# Patient Record
Sex: FEMALE | Race: BLACK OR AFRICAN AMERICAN | NOT HISPANIC OR LATINO | Employment: UNEMPLOYED | URBAN - METROPOLITAN AREA
[De-identification: names, ages, dates, MRNs, and addresses within clinical notes are randomized per-mention and may not be internally consistent; named-entity substitution may affect disease eponyms.]

---

## 2017-02-13 PROBLEM — Z30.09 OTHER GENERAL COUNSELING AND ADVICE FOR CONTRACEPTIVE MANAGEMENT: Status: ACTIVE | Noted: 2017-02-13

## 2018-01-01 ENCOUNTER — HOSPITAL ENCOUNTER (EMERGENCY)
Facility: HOSPITAL | Age: 33
Discharge: HOME | End: 2018-01-01
Attending: EMERGENCY MEDICINE
Payer: MEDICAID

## 2018-01-01 VITALS
TEMPERATURE: 98 F | BODY MASS INDEX: 33.13 KG/M2 | HEART RATE: 100 BPM | OXYGEN SATURATION: 100 % | HEIGHT: 62 IN | DIASTOLIC BLOOD PRESSURE: 72 MMHG | SYSTOLIC BLOOD PRESSURE: 119 MMHG | RESPIRATION RATE: 20 BRPM | WEIGHT: 180 LBS

## 2018-01-01 DIAGNOSIS — F41.0 PANIC ATTACK: Primary | ICD-10-CM

## 2018-01-01 LAB
B-HCG UR QL: NEGATIVE
CTP QC/QA: YES

## 2018-01-01 PROCEDURE — 63600175 PHARM REV CODE 636 W HCPCS

## 2018-01-01 PROCEDURE — 81025 URINE PREGNANCY TEST: CPT | Performed by: EMERGENCY MEDICINE

## 2018-01-01 PROCEDURE — 96374 THER/PROPH/DIAG INJ IV PUSH: CPT

## 2018-01-01 PROCEDURE — 25000003 PHARM REV CODE 250: Performed by: EMERGENCY MEDICINE

## 2018-01-01 PROCEDURE — 99284 EMERGENCY DEPT VISIT MOD MDM: CPT | Mod: 25

## 2018-01-01 RX ORDER — LORAZEPAM 2 MG/ML
INJECTION INTRAMUSCULAR
Status: COMPLETED
Start: 2018-01-01 | End: 2018-01-01

## 2018-01-01 RX ORDER — ALPRAZOLAM 0.5 MG/1
0.5 TABLET ORAL 2 TIMES DAILY PRN
Qty: 10 TABLET | Refills: 0 | Status: SHIPPED | OUTPATIENT
Start: 2018-01-01 | End: 2018-05-15

## 2018-01-01 RX ORDER — DIAZEPAM 2 MG/1
2 TABLET ORAL
Status: COMPLETED | OUTPATIENT
Start: 2018-01-01 | End: 2018-01-01

## 2018-01-01 RX ORDER — TRAZODONE HYDROCHLORIDE 50 MG/1
50 TABLET ORAL NIGHTLY PRN
COMMUNITY
End: 2018-08-13

## 2018-01-01 RX ORDER — OXCARBAZEPINE 150 MG/1
300 TABLET, FILM COATED ORAL 2 TIMES DAILY
COMMUNITY
End: 2019-08-21

## 2018-01-01 RX ORDER — LORAZEPAM 2 MG/ML
1 INJECTION INTRAMUSCULAR
Status: COMPLETED | OUTPATIENT
Start: 2018-01-01 | End: 2018-01-01

## 2018-01-01 RX ADMIN — LORAZEPAM 1 MG: 2 INJECTION INTRAMUSCULAR at 02:01

## 2018-01-01 RX ADMIN — DIAZEPAM 2 MG: 2 TABLET ORAL at 03:01

## 2018-01-01 RX ADMIN — LORAZEPAM 1 MG: 2 INJECTION INTRAMUSCULAR; INTRAVENOUS at 02:01

## 2018-01-01 NOTE — DISCHARGE INSTRUCTIONS
"Return to the Emergency Department of any acute worsening of your symptoms or for any other concern.     You should return to the ED for fever/chills, shortness of breath, chest pain, weakness or "passing out".     Pt should take all medications as prescribed.    Pt should follow up with PCP as soon as possible.    The risks associated with not taking your medications as prescribed and not following up with your Primary Care doctor or sub specialist includes worsening of your condition, pain, disability, loss of function or livelihood, and death      DAVE Duff M.D. 3:54 AM 1/1/2018      Our goal in the emergency department is to always give you outstanding care and exceptional service. You may receive a survey by mail or e-mail in the next week regarding your experience in our ED. We would greatly appreciate your completing and returning the survey. Your feedback provides us with a way to recognize our staff who give very good care and it helps us learn how to improve when your experience was below our aspiration of excellence.     "

## 2018-01-01 NOTE — ED PROVIDER NOTES
Encounter Date: 2018    SCRIBE #1 NOTE: I, Jonathan Villalba, am scribing for, and in the presence of,  Navjot Duff MD. I have scribed the following portions of the note - Other sections scribed: HPI, ROS.       History     Chief Complaint   Patient presents with    Seizures     Patienr presents with a seizure after noncompliance with medications x 2 weeks. Patient also has been drinking tonight.      CC: Panic Attacks    HPI: This 32 y.o. Female, smoker, with a medical history of seizure presents to the ED via EMS c/o panic attack that began just prior to arrival. Panic attack was induced secondary to pt thinking about her  daughter that passed away near this time. EMS reported pt was having a seizure due to being off her seizure medication, but pt reports current symptoms are due to a panic attack. EMS reports pt has been off her trileptal for 2 weeks.       The history is provided by the EMS personnel and the patient. The history is limited by the condition of the patient. No  was used.     Review of patient's allergies indicates:  No Known Allergies  Past Medical History:   Diagnosis Date    Anxiety     PTSD (post-traumatic stress disorder)     Seizures      Past Surgical History:   Procedure Laterality Date     SECTION      x 2     Family History   Problem Relation Age of Onset    No Known Problems Mother     No Known Problems Father     Heart disease Maternal Grandfather     Breast cancer Paternal Grandmother     Colon cancer Neg Hx     Ovarian cancer Neg Hx      Social History   Substance Use Topics    Smoking status: Current Every Day Smoker     Years: 3.00     Types: Cigarettes, Cigars    Smokeless tobacco: Never Used      Comment: Pt smokes 2 to 3 cigars a day.    Alcohol use Yes      Comment: Pt reports she drinks liquor 2 to 3 times a week.     Review of Systems   Unable to perform ROS: Acuity of condition   Neurological: Negative for seizures.    Psychiatric/Behavioral: The patient is nervous/anxious.         (+) Panic attack       Physical Exam     Initial Vitals [01/01/18 0240]   BP Pulse Resp Temp SpO2   128/78 100 20 98.7 °F (37.1 °C) 100 %      MAP       94.67         Physical Exam    Vitals reviewed.  Constitutional: She appears well-developed and well-nourished.   HENT:   Head: Normocephalic and atraumatic.   Nose: Nose normal.   Mouth/Throat: No oropharyngeal exudate.   Eyes: EOM are normal. Pupils are equal, round, and reactive to light.   Neck: Normal range of motion. Neck supple. No JVD present.   Cardiovascular: Regular rhythm and normal heart sounds. Exam reveals no gallop and no friction rub.    No murmur heard.  Pulmonary/Chest: Breath sounds normal. No stridor. No respiratory distress. She has no wheezes. She has no rhonchi. She has no rales. She exhibits no tenderness.   Abdominal: Soft. Bowel sounds are normal. She exhibits no distension and no mass. There is no tenderness. There is no rebound and no guarding.   Musculoskeletal: Normal range of motion. She exhibits no edema or tenderness.   Neurological: She is alert and oriented to person, place, and time. She has normal strength. No cranial nerve deficit or sensory deficit. She exhibits normal muscle tone. GCS eye subscore is 4. GCS verbal subscore is 5. GCS motor subscore is 6.   Patient is shaking but this seems volitional.  She has good motor strength.  She is able to follow commands.  She is able to stutter her name to me.  My whole hand above her head.  She is able to volitionally move it away.   Skin: Skin is warm and dry.   Psychiatric: Her mood appears anxious. Her speech is delayed and slurred. She expresses impulsivity. She exhibits a depressed mood. She expresses no homicidal and no suicidal ideation.   Patient is tearful and smells of alcohol.         ED Course   Procedures  Labs Reviewed   POCT URINE PREGNANCY             Medical Decision Making:   History:   Old Medical  Records: I decided to obtain old medical records.  Initial Assessment:   Medical decision-making:    The patient received a medical screening exam. If performed, the EKG was independently evaluated by me and is pending final cardiology evaluation.  If performed, all radiographic studies were independently evaluated by me and are pending final radiology evaluation. If labs were ordered, they were reviewed. Vital signs are independently assessed by me.  If performed, the pulse oximetry was independently evaluated by me.  I decided to obtain the patient's past medical record.  If available, I reviewed the patient's past medical record, including most recent labs and radiology reports.    ED Management:  Patient was brought in for evaluation of some altered mental status.  There was some concern that the patient may be having a seizure.  She does not take Trileptal like it is prescribed.  She the patient states that she has never been diagnosed with a seizure disorder.  She says that the Trileptal is prescribed for mood alteration.  She states that she has never had an EEG or been diagnosed with seizures by a neurologist.  Upon presentation to the emergency department.  The patient is somewhat disorganized and shaking but has volitional movements and no sign of any neurological deficit.  The patient was administered Ativan with improvement of symptoms.  The patient was able to tell me that she suffers with debilitating panic attacks since the passing of her daughter one year ago.  She has been drinking and perseverating over that today.  I do not think the patient is having a true seizure.  I do not think she has an intracranial hemorrhage, or severe metabolic disturbance.  I think that she is acutely intoxicated with alcohol and is having a panic attack.  I will recommend that she follow-up with psychiatry.  I have encouraged her to discontinue alcohol use for the time being.  Xanax as needed when limited quantities.   Return precautions given.  The patient is requesting a sandwich at this time.    The results and physical exam findings were reviewed with the patient. Pt agrees with assessment, disposition and treatment plan and has no further questions or complaints at this time.    DAVE Duff M.D. 4:17 AM 1/1/2018              Scribe Attestation:   Scribe #1: I performed the above scribed service and the documentation accurately describes the services I performed. I attest to the accuracy of the note.    Attending Attestation:           Physician Attestation for Scribe:  Physician Attestation Statement for Scribe #1: I, Navjot Duff MD, reviewed documentation, as scribed by Jonathan Villalba in my presence, and it is both accurate and complete.                 ED Course      Clinical Impression:   The encounter diagnosis was Panic attack.                           Navjot Duff MD  01/01/18 0417

## 2018-01-01 NOTE — ED TRIAGE NOTES
P{t presents to the Ed via EMS after they state that her neighbors called them because she was having a seizure. Per EMS arrived opt was post ictal and staring. EMS states that pt woke up and said that she has a hx of seizures and has not taken her medication. They also report her telling them that she was drinkking and smoking pit. While pt was awaiting too assignment she had seizure like activity and was placed in a room. Pt crying to MD stating that she had an anxiety attack because she misses her daughter who passed away 1 year ago. Will continue to monitor.

## 2018-05-15 ENCOUNTER — HOSPITAL ENCOUNTER (EMERGENCY)
Facility: HOSPITAL | Age: 33
Discharge: HOME | End: 2018-05-15
Attending: EMERGENCY MEDICINE
Payer: MEDICAID

## 2018-05-15 VITALS
BODY MASS INDEX: 36.91 KG/M2 | HEART RATE: 94 BPM | OXYGEN SATURATION: 98 % | SYSTOLIC BLOOD PRESSURE: 154 MMHG | DIASTOLIC BLOOD PRESSURE: 89 MMHG | HEIGHT: 60 IN | WEIGHT: 188 LBS | RESPIRATION RATE: 20 BRPM | TEMPERATURE: 98 F

## 2018-05-15 DIAGNOSIS — F41.9 ACUTE ANXIETY: Primary | ICD-10-CM

## 2018-05-15 LAB
B-HCG UR QL: NEGATIVE
CTP QC/QA: YES

## 2018-05-15 PROCEDURE — 96374 THER/PROPH/DIAG INJ IV PUSH: CPT

## 2018-05-15 PROCEDURE — 81025 URINE PREGNANCY TEST: CPT | Performed by: EMERGENCY MEDICINE

## 2018-05-15 PROCEDURE — 99284 EMERGENCY DEPT VISIT MOD MDM: CPT | Mod: 25

## 2018-05-15 PROCEDURE — 63600175 PHARM REV CODE 636 W HCPCS: Performed by: EMERGENCY MEDICINE

## 2018-05-15 RX ORDER — LORAZEPAM 2 MG/ML
1 INJECTION INTRAMUSCULAR
Status: COMPLETED | OUTPATIENT
Start: 2018-05-15 | End: 2018-05-15

## 2018-05-15 RX ORDER — ALPRAZOLAM 0.5 MG/1
0.5 TABLET ORAL 4 TIMES DAILY PRN
Qty: 4 TABLET | Refills: 0 | Status: SHIPPED | OUTPATIENT
Start: 2018-05-15 | End: 2019-08-21

## 2018-05-15 RX ADMIN — LORAZEPAM 1 MG: 2 INJECTION, SOLUTION INTRAMUSCULAR; INTRAVENOUS at 04:05

## 2018-05-15 NOTE — ED PROVIDER NOTES
"Encounter Date: 5/15/2018    SCRIBE #1 NOTE: I, Vanessashantevonda Virgilio, am scribing for, and in the presence of,  Mane Adair MD. I have scribed the following portions of the note - Other sections scribed: HPI and ROS.       History     Chief Complaint   Patient presents with    Panic Attack     anxiety attack since Mother's Day. " I took my meds but it's not working." Pt is shaking in WC at this time. + tachypnea.      Chief Complaint: Panic Attack     HPI: This 32 y.o. Female with seizures, anxiety, and PTSD presents to the ED c/o persistent anxiety attacks. Symptoms began 2 days ago. Symptoms are severe and worsening. There's associated sleep disturbances due to anxiety. No improvement or relief with prescribed anxiety medications. She denies fever, chills, chest pain, cough, SOB, nausea, vomiting, or diarrhea.       The history is provided by the patient. No  was used.     Review of patient's allergies indicates:  No Known Allergies  Past Medical History:   Diagnosis Date    Anxiety     PTSD (post-traumatic stress disorder)     Seizures      Past Surgical History:   Procedure Laterality Date     SECTION      x 2     Family History   Problem Relation Age of Onset    No Known Problems Mother     No Known Problems Father     Heart disease Maternal Grandfather     Breast cancer Paternal Grandmother     Colon cancer Neg Hx     Ovarian cancer Neg Hx      Social History   Substance Use Topics    Smoking status: Current Every Day Smoker     Years: 3.00     Types: Cigarettes, Cigars    Smokeless tobacco: Never Used      Comment: Pt smokes 2 to 3 cigars a day.    Alcohol use Yes      Comment: Pt reports she drinks liquor 2 to 3 times a week.     Review of Systems   Constitutional: Negative for chills and fever.   HENT: Negative for ear pain and sore throat.    Eyes: Negative for pain.   Respiratory: Negative for cough and shortness of breath.    Cardiovascular: Negative for chest pain. "   Gastrointestinal: Negative for abdominal pain, diarrhea, nausea and vomiting.   Genitourinary: Negative for dysuria and hematuria.   Musculoskeletal: Negative for myalgias (arm or leg pain).   Skin: Negative for rash.   Neurological: Negative for headaches.   Psychiatric/Behavioral: Positive for sleep disturbance. The patient is nervous/anxious.        Physical Exam     Initial Vitals [05/15/18 1528]   BP Pulse Resp Temp SpO2   (!) 136/96 90 (!) 26 98.3 °F (36.8 °C) 100 %      MAP       109.33         Physical Exam  The patient was examined specifically for the following:   General:No significant distress, Good color, Warm and dry. Head and neck:Scalp atraumatic, Neck supple. Neurological:Appropriate conversation, Gross motor deficits. Eyes:Conjugate gaze, Clear corneas. ENT: No epistaxis. Cardiac: Regular rate and rhythm, Grossly normal heart tones. Pulmonary: Wheezing, Rales. Gastrointestinal: Abdominal tenderness, Abdominal distention. Musculoskeletal: Extremity deformity, Apparent pain with range of motion of the joints. Skin: Rash.   The findings on examination were normal except for the following:  Patient has stuttering.  She is complaining of anxiety.  The lungs are clear.  The heart tones are normal.  The abdomen is soft.  Patient is breathing fast.  She is not suicidal homicidal or psychotic  ED Course   Procedures  Labs Reviewed   POCT URINE PREGNANCY         Medical decision making:  Given the above, this patient presents to the emergency room feeling anxious and stuttering.  She is not suicidal homicidal or psychotic.  She is on multiple psychiatric medicines at this time.  She responded nicely to IV Ativan.  I will send her home with 3 1 mg tablets and have her follow up with her psychiatrist, who is out of town, or the [facility] a tomorrow.  I do not believe that she is a risk to herself or others.                  Scribe Attestation:   Scribe #1: I performed the above scribed service and the  documentation accurately describes the services I performed. I attest to the accuracy of the note.    Attending Attestation:           Physician Attestation for Scribe:  Physician Attestation Statement for Scribe #1: I, Mane Adair MD, reviewed documentation, as scribed by Terrell Poole in my presence, and it is both accurate and complete.                    Clinical Impression:   The encounter diagnosis was Acute anxiety.                           Mane Adair MD  05/15/18 2688

## 2018-05-15 NOTE — DISCHARGE INSTRUCTIONS
Please follow-up with your psychiatrist, or the [facility], tomorrow morning.  Please return immediately if you get worse or if new problems develop.  Ativan as directed.  Rest.  Please continue your other medicines as they are prescribed.

## 2018-08-13 ENCOUNTER — HOSPITAL ENCOUNTER (EMERGENCY)
Facility: HOSPITAL | Age: 33
Discharge: HOME | End: 2018-08-13
Attending: EMERGENCY MEDICINE
Payer: MEDICAID

## 2018-08-13 VITALS
HEIGHT: 60 IN | OXYGEN SATURATION: 99 % | HEART RATE: 96 BPM | RESPIRATION RATE: 18 BRPM | BODY MASS INDEX: 38.87 KG/M2 | SYSTOLIC BLOOD PRESSURE: 130 MMHG | TEMPERATURE: 98 F | WEIGHT: 198 LBS | DIASTOLIC BLOOD PRESSURE: 66 MMHG

## 2018-08-13 DIAGNOSIS — S00.83XA FACIAL CONTUSION, INITIAL ENCOUNTER: ICD-10-CM

## 2018-08-13 DIAGNOSIS — S09.90XA HEAD INJURIES, INITIAL ENCOUNTER: ICD-10-CM
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
## 2018-08-13 DIAGNOSIS — M25.539 WRIST PAIN: ICD-10-CM

## 2018-08-13 DIAGNOSIS — S60.211A CONTUSION OF RIGHT WRIST, INITIAL ENCOUNTER: Primary | ICD-10-CM

## 2018-08-13 LAB
B-HCG UR QL: NEGATIVE
CTP QC/QA: YES

## 2018-08-13 PROCEDURE — 25000003 PHARM REV CODE 250: Performed by: PHYSICIAN ASSISTANT

## 2018-08-13 PROCEDURE — 81025 URINE PREGNANCY TEST: CPT | Performed by: PHYSICIAN ASSISTANT

## 2018-08-13 PROCEDURE — 99284 EMERGENCY DEPT VISIT MOD MDM: CPT | Mod: 25

## 2018-08-13 RX ORDER — ORPHENADRINE CITRATE 100 MG/1
100 TABLET, EXTENDED RELEASE ORAL 2 TIMES DAILY
Qty: 8 TABLET | Refills: 0 | Status: SHIPPED | OUTPATIENT
Start: 2018-08-13 | End: 2018-08-17

## 2018-08-13 RX ORDER — HYDROCODONE BITARTRATE AND ACETAMINOPHEN 5; 325 MG/1; MG/1
1 TABLET ORAL EVERY 8 HOURS PRN
Qty: 8 TABLET | Refills: 0 | Status: SHIPPED | OUTPATIENT
Start: 2018-08-13 | End: 2019-08-21

## 2018-08-13 RX ORDER — MELOXICAM 7.5 MG/1
7.5 TABLET ORAL DAILY
Qty: 12 TABLET | Refills: 0 | Status: SHIPPED | OUTPATIENT
Start: 2018-08-13 | End: 2019-08-21

## 2018-08-13 RX ORDER — HYDROCODONE BITARTRATE AND ACETAMINOPHEN 5; 325 MG/1; MG/1
1 TABLET ORAL
Status: COMPLETED | OUTPATIENT
Start: 2018-08-13 | End: 2018-08-13

## 2018-08-13 RX ADMIN — HYDROCODONE BITARTRATE AND ACETAMINOPHEN 1 TABLET: 5; 325 TABLET ORAL at 10:08
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
## 2019-01-08 ENCOUNTER — HOSPITAL ENCOUNTER (EMERGENCY)
Facility: HOSPITAL | Age: 34
Discharge: HOME | End: 2019-01-09
Attending: EMERGENCY MEDICINE
Payer: MEDICAID

## 2019-01-08 DIAGNOSIS — F41.0 ANXIETY ATTACK: Primary | ICD-10-CM

## 2019-01-08 DIAGNOSIS — Z63.4 GRIEF AT LOSS OF CHILD: ICD-10-CM

## 2019-01-08 DIAGNOSIS — F43.21 GRIEF AT LOSS OF CHILD: ICD-10-CM

## 2019-01-08 PROCEDURE — 96374 THER/PROPH/DIAG INJ IV PUSH: CPT

## 2019-01-08 PROCEDURE — 96375 TX/PRO/DX INJ NEW DRUG ADDON: CPT

## 2019-01-08 PROCEDURE — 99284 EMERGENCY DEPT VISIT MOD MDM: CPT | Mod: 25

## 2019-01-08 PROCEDURE — 82962 GLUCOSE BLOOD TEST: CPT

## 2019-01-08 RX ORDER — LORAZEPAM 2 MG/ML
1 INJECTION INTRAMUSCULAR
Status: COMPLETED | OUTPATIENT
Start: 2019-01-08 | End: 2019-01-09

## 2019-01-08 SDOH — SOCIAL DETERMINANTS OF HEALTH (SDOH): DISSAPEARANCE AND DEATH OF FAMILY MEMBER: Z63.4

## 2019-01-09 VITALS
HEART RATE: 90 BPM | WEIGHT: 178 LBS | SYSTOLIC BLOOD PRESSURE: 112 MMHG | HEIGHT: 64 IN | DIASTOLIC BLOOD PRESSURE: 69 MMHG | TEMPERATURE: 98 F | BODY MASS INDEX: 30.39 KG/M2 | OXYGEN SATURATION: 100 % | RESPIRATION RATE: 19 BRPM

## 2019-01-09 LAB
GLUCOSE SERPL-MCNC: 80 MG/DL (ref 70–110)
POCT GLUCOSE: 80 MG/DL (ref 70–110)

## 2019-01-09 PROCEDURE — 63600175 PHARM REV CODE 636 W HCPCS: Performed by: EMERGENCY MEDICINE

## 2019-01-09 RX ORDER — ONDANSETRON 2 MG/ML
8 INJECTION INTRAMUSCULAR; INTRAVENOUS
Status: COMPLETED | OUTPATIENT
Start: 2019-01-09 | End: 2019-01-09

## 2019-01-09 RX ADMIN — ONDANSETRON 8 MG: 2 INJECTION INTRAMUSCULAR; INTRAVENOUS at 01:01

## 2019-01-09 RX ADMIN — LORAZEPAM 1 MG: 2 INJECTION, SOLUTION INTRAMUSCULAR; INTRAVENOUS at 12:01

## 2019-01-09 NOTE — ED PROVIDER NOTES
"Encounter Date: 2019    SCRIBE #1 NOTE: I, Iain Knight, am scribing for, and in the presence of,  DAVE Duff MD. I have scribed the following portions of the note - Other sections scribed: HPI and ROS.       History     Chief Complaint   Patient presents with    Panic Attack     Per EMS, family called after pt had what appeared to be 2 seizures.  EMS witnessed another episode in route.  EMS gave 5 mg Versed.  Pt states that she has no seizure hx and has panic attacks that look like seizures.  Pt states she is depressed because her baby  2 years ago.  Denies SI/HI.      CC: Panic Attack     HPI: This 33 y.o F with a hx of Anxiety, PTSD, and Seizures presents to the ED via EMS for emergent evaluation of a panic attack. Per EMS, the family called after the pt had what appeared to be 2 seizures. EMS witnessed another episode of seizure like activity and administered 5mg Versed. The pt denies a hx of seizure stating, "I have panic attacks that look like seizures." She reports increased stress and depression because her baby  x2 years ago. She denies SI and HI. She also denies fever, chills, diaphoresis, nausea, emesis, diarrhea, abdominal pain, chest pain, back pain, dysuria, difficulty urinating and rash.        The history is provided by the patient. No  was used.     Review of patient's allergies indicates:  No Known Allergies  Past Medical History:   Diagnosis Date    Anxiety     PTSD (post-traumatic stress disorder)     Seizures      Past Surgical History:   Procedure Laterality Date     SECTION      x 2    MOUTH SURGERY       Family History   Problem Relation Age of Onset    No Known Problems Mother     No Known Problems Father     Heart disease Maternal Grandfather     Breast cancer Paternal Grandmother     Colon cancer Neg Hx     Ovarian cancer Neg Hx      Social History     Tobacco Use    Smoking status: Current Every Day Smoker     Years: 3.00     " Types: Cigarettes, Cigars    Smokeless tobacco: Never Used    Tobacco comment: Pt smokes 2 to 3 cigars a day.   Substance Use Topics    Alcohol use: Yes     Comment: Pt reports she drinks liquor 2 to 3 times a week.    Drug use: Yes     Types: Marijuana     Review of Systems   Constitutional: Negative for chills, diaphoresis and fever.   HENT: Negative for rhinorrhea and sore throat.    Eyes: Negative for redness.   Respiratory: Negative for cough and shortness of breath.    Cardiovascular: Negative for chest pain.   Gastrointestinal: Negative for abdominal pain, diarrhea, nausea and vomiting.   Genitourinary: Negative for dysuria, frequency and urgency.   Musculoskeletal: Negative for back pain and neck pain.   Skin: Negative for rash.   Neurological: Negative for seizures.   Psychiatric/Behavioral: Negative for suicidal ideas. The patient is not nervous/anxious.         (+) Panic attack   (-) HI       Physical Exam     Initial Vitals [01/08/19 2341]   BP Pulse Resp Temp SpO2   (!) 140/90 92 18 98.4 °F (36.9 °C) 98 %      MAP       --         Physical Exam    Constitutional: She appears well-developed and well-nourished. She appears distressed.   Shaking all over and appearing distress.  Eyes are open.  Patient has Wendi limb movements.  This is not seizure activity.   Eyes: Conjunctivae and EOM are normal. Pupils are equal, round, and reactive to light.   Neck: Normal range of motion.   Cardiovascular: Normal rate, regular rhythm and intact distal pulses.   Pulmonary/Chest: Breath sounds normal. No respiratory distress. She has no wheezes. She has no rales.   Abdominal: She exhibits no distension. There is no tenderness. There is no rebound.   Musculoskeletal: Normal range of motion. She exhibits no tenderness.   Neurological: She is alert and oriented to person, place, and time. She has normal strength.   Following commands and awake.   Skin: Skin is warm.   Psychiatric: Her mood appears anxious. Her speech  is delayed. She is withdrawn. Thought content is not delusional. She exhibits a depressed mood. She expresses no homicidal and no suicidal ideation. She expresses no suicidal plans and no homicidal plans.         ED Course   Procedures  Labs Reviewed - No data to display       Imaging Results    None          Medical Decision Making:   History:   Old Medical Records: I decided to obtain old medical records.  Initial Assessment:   Medical decision-making:    The patient received a medical screening exam. If performed, the EKG was independently evaluated by me and is pending final cardiology evaluation.  If performed, all radiographic studies were independently evaluated by me and are pending final radiology evaluation. If labs were ordered, they were reviewed. Vital signs are independently assessed by me.  If performed, the pulse oximetry was independently evaluated by me.  I decided to obtain the patient's past medical record.  If available, I reviewed the patient's past medical record, including most recent labs and radiology reports.    ED Management:  Patient is having a full-blown anxiety attack.  This is around the anniversary reportedly of her daughter's death.  She is experiencing some extreme grief.  I do not think this is seizure activity.  She is awake alert and oriented and acting volitionally.  This is not pseudo-seizure.  This is anxiety attack.  Patient was given medications in the emergency department with improvement in symptoms.  I will refer her to Mental Health counseling.    The results and physical exam findings were reviewed with the patient. Pt agrees with assessment, disposition and treatment plan and has no further questions or complaints at this time.    DAVE Duff M.D. 1:46 AM 1/9/2019              Scribe Attestation:   Scribe #1: I performed the above scribed service and the documentation accurately describes the services I performed. I attest to the accuracy of the  note.    Attending Attestation:           Physician Attestation for Scribe:  Physician Attestation Statement for Scribe #1: IDAVE MD, reviewed documentation, as scribed by Iain Knight in my presence, and it is both accurate and complete.                    Clinical Impression:   The primary encounter diagnosis was Anxiety attack. A diagnosis of Grief at loss of child was also pertinent to this visit.                             Navjot Duff MD  01/09/19 [id number]

## 2019-01-09 NOTE — ED TRIAGE NOTES
Per EMS, family called after pt had what appeared to be 2 seizures.  EMS witnessed another episode in route.  EMS gave 5 mg Versed.  Pt states that she has no seizure hx and has panic attacks that look like seizures.  Pt states she is depressed because her baby  2 years ago.  Denies SI/HI.

## 2019-01-09 NOTE — DISCHARGE INSTRUCTIONS
"Return to the Emergency Department of any acute worsening of your symptoms or for any other concern.     You should return to the ED for fever/chills, shortness of breath, chest pain, weakness or "passing out".     Pt should take all medications as prescribed.    Pt should follow up with PCP as soon as possible.    The risks associated with not taking your medications as prescribed and not following up with your Primary Care doctor or sub specialist includes worsening of your condition, pain, disability, loss of function or livelihood, and death      DAVE Duff M.D. 1:53 AM 1/9/2019      Our goal in the emergency department is to always give you outstanding care and exceptional service. You may receive a survey by mail or e-mail in the next week regarding your experience in our ED. We would greatly appreciate your completing and returning the survey. Your feedback provides us with a way to recognize our staff who give very good care and it helps us learn how to improve when your experience was below our aspiration of excellence.     "

## 2019-07-27 ENCOUNTER — HOSPITAL ENCOUNTER (EMERGENCY)
Facility: HOSPITAL | Age: 34
Discharge: HOME | End: 2019-07-27
Attending: EMERGENCY MEDICINE
Payer: MEDICAID

## 2019-07-27 VITALS
TEMPERATURE: 98 F | DIASTOLIC BLOOD PRESSURE: 69 MMHG | SYSTOLIC BLOOD PRESSURE: 119 MMHG | BODY MASS INDEX: 30.55 KG/M2 | WEIGHT: 178 LBS | HEART RATE: 90 BPM | RESPIRATION RATE: 18 BRPM | OXYGEN SATURATION: 100 %

## 2019-07-27 DIAGNOSIS — R56.9 SEIZURES: ICD-10-CM

## 2019-07-27 DIAGNOSIS — R56.9 SEIZURE-LIKE ACTIVITY: Primary | ICD-10-CM

## 2019-07-27 DIAGNOSIS — R56.9 SEIZURE: ICD-10-CM

## 2019-07-27 LAB
ALBUMIN SERPL BCP-MCNC: 3.8 G/DL (ref 3.5–5.2)
ALP SERPL-CCNC: 91 U/L (ref 55–135)
ALT SERPL W/O P-5'-P-CCNC: 15 U/L (ref 10–44)
AMPHET+METHAMPHET UR QL: NEGATIVE
ANION GAP SERPL CALC-SCNC: 11 MMOL/L (ref 8–16)
AST SERPL-CCNC: 23 U/L (ref 10–40)
B-HCG UR QL: NEGATIVE
BARBITURATES UR QL SCN>200 NG/ML: NEGATIVE
BASOPHILS # BLD AUTO: 0.02 K/UL (ref 0–0.2)
BASOPHILS NFR BLD: 0.3 % (ref 0–1.9)
BENZODIAZ UR QL SCN>200 NG/ML: NEGATIVE
BILIRUB SERPL-MCNC: 0.3 MG/DL (ref 0.1–1)
BILIRUB UR QL STRIP: NEGATIVE
BUN SERPL-MCNC: 2 MG/DL (ref 6–20)
BZE UR QL SCN: NEGATIVE
CALCIUM SERPL-MCNC: 9.3 MG/DL (ref 8.7–10.5)
CANNABINOIDS UR QL SCN: NORMAL
CHLORIDE SERPL-SCNC: 107 MMOL/L (ref 95–110)
CLARITY UR: CLEAR
CO2 SERPL-SCNC: 22 MMOL/L (ref 23–29)
COLOR UR: COLORLESS
CREAT SERPL-MCNC: 0.8 MG/DL (ref 0.5–1.4)
CREAT UR-MCNC: 23.3 MG/DL (ref 15–325)
CTP QC/QA: YES
DIFFERENTIAL METHOD: ABNORMAL
EOSINOPHIL # BLD AUTO: 0.1 K/UL (ref 0–0.5)
EOSINOPHIL NFR BLD: 1.2 % (ref 0–8)
ERYTHROCYTE [DISTWIDTH] IN BLOOD BY AUTOMATED COUNT: 17.2 % (ref 11.5–14.5)
EST. GFR  (AFRICAN AMERICAN): >60 ML/MIN/1.73 M^2
EST. GFR  (NON AFRICAN AMERICAN): >60 ML/MIN/1.73 M^2
ETHANOL SERPL-MCNC: 165 MG/DL
GLUCOSE SERPL-MCNC: 91 MG/DL (ref 70–110)
GLUCOSE UR QL STRIP: NEGATIVE
HCT VFR BLD AUTO: 34.6 % (ref 37–48.5)
HGB BLD-MCNC: 10.8 G/DL (ref 12–16)
HGB UR QL STRIP: NEGATIVE
KETONES UR QL STRIP: NEGATIVE
LEUKOCYTE ESTERASE UR QL STRIP: NEGATIVE
LYMPHOCYTES # BLD AUTO: 2.9 K/UL (ref 1–4.8)
LYMPHOCYTES NFR BLD: 39.6 % (ref 18–48)
MCH RBC QN AUTO: 25.6 PG (ref 27–31)
MCHC RBC AUTO-ENTMCNC: 31.2 G/DL (ref 32–36)
MCV RBC AUTO: 82 FL (ref 82–98)
METHADONE UR QL SCN>300 NG/ML: NEGATIVE
MONOCYTES # BLD AUTO: 0.6 K/UL (ref 0.3–1)
MONOCYTES NFR BLD: 7.9 % (ref 4–15)
NEUTROPHILS # BLD AUTO: 3.7 K/UL (ref 1.8–7.7)
NEUTROPHILS NFR BLD: 51.1 % (ref 38–73)
NITRITE UR QL STRIP: NEGATIVE
OPIATES UR QL SCN: NEGATIVE
PCP UR QL SCN>25 NG/ML: NEGATIVE
PH UR STRIP: 7 [PH] (ref 5–8)
PLATELET # BLD AUTO: 293 K/UL (ref 150–350)
PMV BLD AUTO: 10.6 FL (ref 9.2–12.9)
POCT GLUCOSE: 98 MG/DL (ref 70–110)
POTASSIUM SERPL-SCNC: 3.9 MMOL/L (ref 3.5–5.1)
PROT SERPL-MCNC: 7.3 G/DL (ref 6–8.4)
PROT UR QL STRIP: NEGATIVE
RBC # BLD AUTO: 4.22 M/UL (ref 4–5.4)
SODIUM SERPL-SCNC: 140 MMOL/L (ref 136–145)
SP GR UR STRIP: 1 (ref 1–1.03)
TOXICOLOGY INFORMATION: NORMAL
URN SPEC COLLECT METH UR: ABNORMAL
UROBILINOGEN UR STRIP-ACNC: NEGATIVE EU/DL
WBC # BLD AUTO: 7.34 K/UL (ref 3.9–12.7)

## 2019-07-27 PROCEDURE — 99285 EMERGENCY DEPT VISIT HI MDM: CPT | Mod: 25

## 2019-07-27 PROCEDURE — 93010 ELECTROCARDIOGRAM REPORT: CPT | Mod: ,,, | Performed by: INTERNAL MEDICINE

## 2019-07-27 PROCEDURE — 81003 URINALYSIS AUTO W/O SCOPE: CPT

## 2019-07-27 PROCEDURE — 81025 URINE PREGNANCY TEST: CPT | Performed by: EMERGENCY MEDICINE

## 2019-07-27 PROCEDURE — 93010 EKG 12-LEAD: ICD-10-PCS | Mod: ,,, | Performed by: INTERNAL MEDICINE

## 2019-07-27 PROCEDURE — 96374 THER/PROPH/DIAG INJ IV PUSH: CPT

## 2019-07-27 PROCEDURE — 93005 ELECTROCARDIOGRAM TRACING: CPT

## 2019-07-27 PROCEDURE — 96361 HYDRATE IV INFUSION ADD-ON: CPT

## 2019-07-27 PROCEDURE — 80053 COMPREHEN METABOLIC PANEL: CPT

## 2019-07-27 PROCEDURE — 63600175 PHARM REV CODE 636 W HCPCS: Performed by: EMERGENCY MEDICINE

## 2019-07-27 PROCEDURE — 80307 DRUG TEST PRSMV CHEM ANLYZR: CPT

## 2019-07-27 PROCEDURE — 85025 COMPLETE CBC W/AUTO DIFF WBC: CPT

## 2019-07-27 PROCEDURE — 80183 DRUG SCRN QUANT OXCARBAZEPIN: CPT

## 2019-07-27 PROCEDURE — 63600175 PHARM REV CODE 636 W HCPCS

## 2019-07-27 PROCEDURE — 80320 DRUG SCREEN QUANTALCOHOLS: CPT

## 2019-07-27 RX ORDER — LORAZEPAM 2 MG/ML
2 INJECTION INTRAMUSCULAR
Status: COMPLETED | OUTPATIENT
Start: 2019-07-27 | End: 2019-07-27

## 2019-07-27 RX ORDER — LORAZEPAM 2 MG/ML
INJECTION INTRAMUSCULAR
Status: COMPLETED
Start: 2019-07-27 | End: 2019-07-27

## 2019-07-27 RX ADMIN — SODIUM CHLORIDE 1000 ML: 0.9 INJECTION, SOLUTION INTRAVENOUS at 04:07

## 2019-07-27 RX ADMIN — LORAZEPAM 2 MG: 2 INJECTION INTRAMUSCULAR; INTRAVENOUS at 03:07

## 2019-07-27 RX ADMIN — LORAZEPAM 2 MG: 2 INJECTION INTRAMUSCULAR at 03:07

## 2019-07-27 NOTE — ED TRIAGE NOTES
"Pt presents to ED via EMS with c/o seizure. Friend at bedside report they were laying on cough watching a movie and she began doing "really weird stuff". EMS report upon their arrival pt appeared posictal. pt reports hx of seizures and taking trileptal but did not take it yesterday. Upon ED arrival pt was alert but non verbal. While being triaged pt became stiff and had tremor in one arm lasting about 1 minute. 2mg ativan was given and pt stopped. Pt now alert and nodding appropriately. No distress is noted at this time will continue to be monitored.   "

## 2019-07-31 LAB — OXCARBAZEPINE METABOLITE: <1 MCG/ML (ref 3–35)

## 2019-08-30 PROBLEM — R87.610 ASCUS WITH POSITIVE HIGH RISK HPV CERVICAL: Status: ACTIVE | Noted: 2019-08-30

## 2019-08-30 PROBLEM — R87.810 ASCUS WITH POSITIVE HIGH RISK HPV CERVICAL: Status: ACTIVE | Noted: 2019-08-30

## 2021-01-22 NOTE — ED PROVIDER NOTES
Encounter Date: 2019       History     Chief Complaint   Patient presents with    Seizures     while sleeping in bed, during triage pt became stiff and had a tremor present in one arm, reports hx of seizures      33 y.o. Female presents via [facility] s/p seizure. Patient was at home in bed and started having generalized shaking; she later stated it was after her friend upset her. Son called EMS. Friend who witnessed event is here with her. EMS states patient was postictal during ride. No meds given. Patient had an additional episode of stiffening up and generalized shaking in hallway here.     Patient has a history of seizure-like activity / anxiety attack in response to stress/anxiety.     PMH: seizures  Psych: anxiety, PTSD  PSH: csection x 2, mouth surgery        Review of patient's allergies indicates:  No Known Allergies  Past Medical History:   Diagnosis Date    Anxiety     PTSD (post-traumatic stress disorder)     Seizures      Past Surgical History:   Procedure Laterality Date     SECTION      x 2    MOUTH SURGERY       Family History   Problem Relation Age of Onset    No Known Problems Mother     No Known Problems Father     Heart disease Maternal Grandfather     Breast cancer Paternal Grandmother     Colon cancer Neg Hx     Ovarian cancer Neg Hx      Social History     Tobacco Use    Smoking status: Current Every Day Smoker     Years: 3.00     Types: Cigarettes, Cigars    Smokeless tobacco: Never Used    Tobacco comment: Pt smokes 2 to 3 cigars a day.   Substance Use Topics    Alcohol use: Yes     Comment: Pt reports she drinks liquor 2 to 3 times a week.    Drug use: Not Currently     Types: Marijuana     Review of Systems   Unable to perform ROS: Mental status change   Neurological: Positive for seizures.       Physical Exam     Initial Vitals   BP Pulse Resp Temp SpO2   19 0356 19 0353 19 0353 19 0353 19 0353   117/68 105 15 98.6 °F (37 °C) 100 %       MAP       --                Physical Exam    Nursing note and vitals reviewed.  Constitutional: She appears well-developed and well-nourished. She is not diaphoretic.   Awake, alert, postictal.   HENT:   Head: Normocephalic and atraumatic.   Mouth/Throat: Oropharynx is clear and moist.   Eyes: Pupils are equal, round, and reactive to light.   Neck: Normal range of motion. Neck supple.   Cardiovascular: Normal rate, regular rhythm and intact distal pulses.   Pulmonary/Chest: Breath sounds normal. No respiratory distress. She has no wheezes. She has no rhonchi. She has no rales.   Abdominal: Soft. There is no tenderness. There is no rebound and no guarding.   Musculoskeletal: Normal range of motion. She exhibits no edema or tenderness.   Neurological: She has normal strength.   Moving all extremities. Patient is responding to questions with nodding but does not verbalize. She is making eye contact. Occasionally she is flickering her eyelids and deviating her eyes upward but this appears volitional.   Skin: Skin is warm and dry. No erythema. No pallor.   Psychiatric:   Following commands.         ED Course   Procedures  Labs Reviewed   CBC W/ AUTO DIFFERENTIAL - Abnormal; Notable for the following components:       Result Value    Hemoglobin 10.8 (*)     Hematocrit 34.6 (*)     Mean Corpuscular Hemoglobin 25.6 (*)     Mean Corpuscular Hemoglobin Conc 31.2 (*)     RDW 17.2 (*)     All other components within normal limits   COMPREHENSIVE METABOLIC PANEL - Abnormal; Notable for the following components:    CO2 22 (*)     BUN, Bld 2 (*)     All other components within normal limits   URINALYSIS, REFLEX TO URINE CULTURE - Abnormal; Notable for the following components:    Color, UA Colorless (*)     All other components within normal limits    Narrative:     Preferred Collection Type->Urine, Clean Catch   ALCOHOL,MEDICAL (ETHANOL) - Abnormal; Notable for the following components:    Alcohol, Medical, Serum 165 (*)      All other components within normal limits   DRUG SCREEN PANEL, URINE EMERGENCY    Narrative:     Preferred Collection Type->Urine, Clean Catch   OXCARBAZEPINE METABOLITE ([facility])   POCT URINE PREGNANCY   POCT GLUCOSE   POCT GLUCOSE MONITORING CONTINUOUS     EKG Readings: (Independently Interpreted)   04:09: NSR, HR 90. Normal axis. No ectopy. No STEMI.        Imaging Results          X-Ray Chest AP Portable (Final result)  Result time 07/27/19 05:36:54    Final result by Musa Gilbert MD (07/27/19 05:36:54)                 Impression:      There is no radiographic evidence for acute intrathoracic process.      Electronically signed by: Musa Gilbert  Date:    07/27/2019  Time:    05:36             Narrative:    EXAMINATION:  XR CHEST AP PORTABLE    CLINICAL HISTORY:  Unspecified convulsions    TECHNIQUE:  Single frontal view of the chest was performed.    COMPARISON:  None    FINDINGS:  Single portable chest view is submitted, there is no prior examination available for comparison.  The cardiomediastinal silhouette appears appropriate, there is no evidence for confluent infiltrate or consolidation, significant pleural effusion or pneumothorax.  The visualized osseous structures appear intact.                              X-Rays:   Independently Interpreted Readings:   Other Readings:  CXR NAD    Medical Decision Making:   History:   I obtained history from: EMS provider.  Old Medical Records: I decided to obtain old medical records.  Old Records Summarized: records from previous admission(s).  Initial Assessment:   34yo female s/p seizure-like activity x 2 here tonight.   Differential Diagnosis:   Differential diagnosis includes underlying seizure disorder, medication noncompliance, toxidrome, withdrawal, metabolic derangement, arrhythmia, intracranial hemorrhage, CVA, intracranial metastatic disease or other space-occupying lesion, other.  Independently Interpreted Test(s):   I have ordered and independently  interpreted X-rays - see prior notes.  I have ordered and independently interpreted EKG Reading(s) - see prior notes  Clinical Tests:   Lab Tests: Ordered and Reviewed  Radiological Study: Ordered and Reviewed  Medical Tests: Ordered and Reviewed  ED Management:  EKG no STEMI. No dysrhythmia.    CXR NAD.    Labs: EtOH 165. Hgb 10.8, baseline. Tox + THC. Otherwise reassuring.     Patient had ativan 2mg IV and NS 1L bolus in ED after seizure-like activity as described in HPI.     After ED tx and time resting, patient was rouseable and appropriate. She stated she had episode after becoming upset at friend. It is unclear whether this was a true seizure or an anxiety attack/PNES. Patient states she is compliant with home meds. I encouraged her to continue those.    D/c'ed with friend.                      Clinical Impression:       ICD-10-CM ICD-9-CM   1. Seizure-like activity R56.9 780.39   2. Seizure R56.9 780.39   3. Seizures R56.9 780.39                                Bharti Sharp MD  07/27/19 0708     IV intact

## 2021-04-16 ENCOUNTER — PATIENT MESSAGE (OUTPATIENT)
Dept: RESEARCH | Facility: HOSPITAL | Age: 36
End: 2021-04-16

## 2023-07-31 ENCOUNTER — HOSPITAL ENCOUNTER (EMERGENCY)
Facility: HOSPITAL | Age: 38
Discharge: HOME | End: 2023-07-31
Attending: EMERGENCY MEDICINE

## 2023-07-31 VITALS
HEART RATE: 100 BPM | HEIGHT: 60 IN | SYSTOLIC BLOOD PRESSURE: 151 MMHG | OXYGEN SATURATION: 99 % | RESPIRATION RATE: 20 BRPM | DIASTOLIC BLOOD PRESSURE: 96 MMHG | BODY MASS INDEX: 42.41 KG/M2 | WEIGHT: 216 LBS | TEMPERATURE: 98 F

## 2023-07-31 DIAGNOSIS — F41.0 PANIC ATTACK: Primary | ICD-10-CM

## 2023-07-31 PROCEDURE — 96374 THER/PROPH/DIAG INJ IV PUSH: CPT

## 2023-07-31 PROCEDURE — 99284 EMERGENCY DEPT VISIT MOD MDM: CPT | Mod: 25

## 2023-07-31 PROCEDURE — 63600175 PHARM REV CODE 636 W HCPCS: Performed by: EMERGENCY MEDICINE

## 2023-07-31 RX ORDER — LORAZEPAM 2 MG/ML
1 INJECTION INTRAMUSCULAR
Status: COMPLETED | OUTPATIENT
Start: 2023-07-31 | End: 2023-07-31

## 2023-07-31 RX ORDER — HYDROXYZINE PAMOATE 50 MG/1
50 CAPSULE ORAL EVERY 8 HOURS PRN
Qty: 20 CAPSULE | Refills: 0 | Status: SHIPPED | OUTPATIENT
Start: 2023-07-31

## 2023-07-31 RX ADMIN — LORAZEPAM 1 MG: 2 INJECTION INTRAMUSCULAR; INTRAVENOUS at 11:07

## 2023-07-31 NOTE — ED PROVIDER NOTES
Encounter Date: 2023    SCRIBE #1 NOTE: I, Stephy Garza, am scribing for, and in the presence of,  Larry Gooden MD. I have scribed the following portions of the note - Other sections scribed: HPI, ROS, PE.       History     Chief Complaint   Patient presents with    Panic Attack     EMS called to 36yo femalet hat was at work when became very anxious and felt like she was having a panic attack. She does have a history of anxiety and panic attack and has not been taking her meds for it. Ems reports that she was shaking when they arrived but did not seem to be a seizure and they were able to talk to her and calm her down some.      Mary Guerra is a 37 y.o. female, with a PMHx of anxiety and PTSD, who presents to the ED with panic attack this AM. Patient reports being at work at 9 AM today when she experiencing a panic attack. Patient reports experiencing syncope but reports being caught before falling onto the floor. Patient endorses change in speech due to anxiety. Patient reports previously taking Prozac and klonopin for anxiety. Denies suicidal ideation, hallucination, homicidal ideation, or other associated symptoms. Patient reports receiving depo-provera injections for birth control.  Per internal medical records, patient visited the ED on 2019 for an anxiety attack. Patient was advised to visit [facility] for a follow up appointment. Patient visited the ED on May 15, 2018 for acute anxiety. Patient was advised to visit [facility] within 1 day for a follow up appointment.      The history is provided by the patient. No  was used.     Review of patient's allergies indicates:  No Known Allergies  Past Medical History:   Diagnosis Date    Anxiety     PTSD (post-traumatic stress disorder)     Seizures      Past Surgical History:   Procedure Laterality Date     SECTION      x 2    MOUTH SURGERY       Family History   Problem Relation Age of Onset    No Known Problems Mother      No Known Problems Father     Heart disease Maternal Grandfather     Breast cancer Paternal Grandmother     Colon cancer Neg Hx     Ovarian cancer Neg Hx      Social History     Tobacco Use    Smoking status: Every Day     Current packs/day: 0.00     Types: Cigarettes, Cigars    Smokeless tobacco: Never    Tobacco comments:     Pt smokes 2 to 3 cigars a day.   Substance Use Topics    Alcohol use: Yes     Comment: Pt reports she drinks liquor 2 to 3 times a week.    Drug use: Not Currently     Types: Marijuana     Review of Systems   Constitutional:  Negative for fever.   HENT:  Negative for sore throat.    Eyes:  Negative for visual disturbance.   Respiratory:  Negative for shortness of breath.    Cardiovascular:  Negative for chest pain.   Gastrointestinal:  Negative for abdominal pain.   Genitourinary:  Negative for difficulty urinating.   Musculoskeletal:  Negative for back pain.   Skin:  Negative for rash.   Neurological:  Negative for headaches.   Psychiatric/Behavioral:  Negative for hallucinations and suicidal ideas. The patient is nervous/anxious.        Physical Exam     Initial Vitals [07/31/23 1005]   BP Pulse Resp Temp SpO2   (!) 153/94 84 20 97.5 °F (36.4 °C) 99 %      MAP       --         Physical Exam    Nursing note and vitals reviewed.  Constitutional: She appears well-developed and well-nourished.   Eyes: EOM are normal. Pupils are equal, round, and reactive to light.   Neck: Neck supple. No thyromegaly present. No JVD present.   Normal range of motion.  Cardiovascular:  Normal rate, regular rhythm, normal heart sounds and intact distal pulses.     Exam reveals no gallop and no friction rub.       No murmur heard.  Pulmonary/Chest: Breath sounds normal. No respiratory distress.   Abdominal: Abdomen is soft. Bowel sounds are normal.   Musculoskeletal:         General: No tenderness or edema. Normal range of motion.      Cervical back: Normal range of motion and neck supple.     Neurological: She  is alert and oriented to person, place, and time. She has normal strength.   Skin: Skin is warm and dry.   Psychiatric: Her mood appears anxious.   Tearful.  Stuttering speech.         ED Course   Procedures  Labs Reviewed - No data to display       Imaging Results    None          Medications   LORazepam injection 1 mg (1 mg Intravenous Given 7/31/23 1123)     Medical Decision Making:   History:   Old Medical Records: I decided to obtain old medical records.  PT symptoms resolved. Not gravely disabled nor suicidal stable for discharge.         Scribe Attestation:   Scribe #1: I performed the above scribed service and the documentation accurately describes the services I performed. I attest to the accuracy of the note.                   I, Larry Gooden, personally performed the services described in this documentation. All medical record entries made by the scribe were at my direction and in my presence. I have reviewed the chart and agree that the record reflects my personal performance and is accurate and complete.    Clinical Impression:   Final diagnoses:  [F41.0] Panic attack (Primary)        ED Disposition Condition    Discharge Stable          ED Prescriptions       Medication Sig Dispense Start Date End Date Auth. Provider    hydrOXYzine pamoate (VISTARIL) 50 MG Cap Take 1 capsule (50 mg total) by mouth every 8 (eight) hours as needed (anxiety). 20 capsule 7/31/2023 -- Larry Gooden MD          Follow-up Information       Follow up With Specialties Details Why Contact Info    [facility] Urgent Care Call today for follow up this week. [address] [place] [address]  [phone]      [facility] - Emergency Dept Emergency Medicine  As needed, If symptoms worsen or new symptoms develop [address] [facility] [id number] [phone]             Larry Gooden MD  08/01/23 3063

## 2023-07-31 NOTE — ED TRIAGE NOTES
Panic Attack (EMS called to 38yo femalet hat was at work when became very anxious and felt like she was having a panic attack. She does have a history of anxiety and panic attack and has not been taking her meds for it. Ems reports that she was shaking when they arrived but did not seem to be a seizure and they were able to talk to her and calm her down some. )

## 2023-11-11 ENCOUNTER — HOSPITAL ENCOUNTER (EMERGENCY)
Facility: HOSPITAL | Age: 38
Discharge: HOME | End: 2023-11-11
Attending: EMERGENCY MEDICINE

## 2023-11-11 VITALS
DIASTOLIC BLOOD PRESSURE: 86 MMHG | HEART RATE: 104 BPM | HEIGHT: 60 IN | TEMPERATURE: 98 F | BODY MASS INDEX: 38.28 KG/M2 | RESPIRATION RATE: 18 BRPM | SYSTOLIC BLOOD PRESSURE: 134 MMHG | OXYGEN SATURATION: 97 % | WEIGHT: 195 LBS

## 2023-11-11 DIAGNOSIS — K61.0 PERIANAL ABSCESS: Primary | ICD-10-CM

## 2023-11-11 DIAGNOSIS — N89.8 VAGINAL DISCHARGE: ICD-10-CM

## 2023-11-11 DIAGNOSIS — K64.4 EXTERNAL HEMORRHOID: ICD-10-CM

## 2023-11-11 LAB
B-HCG UR QL: NEGATIVE
CTP QC/QA: YES

## 2023-11-11 PROCEDURE — 99284 EMERGENCY DEPT VISIT MOD MDM: CPT | Mod: 25,ER

## 2023-11-11 PROCEDURE — 87491 CHLMYD TRACH DNA AMP PROBE: CPT

## 2023-11-11 PROCEDURE — 81025 URINE PREGNANCY TEST: CPT | Mod: ER

## 2023-11-11 PROCEDURE — 81514 NFCT DS BV&VAGINITIS DNA ALG: CPT

## 2023-11-11 PROCEDURE — 81025 URINE PREGNANCY TEST: CPT | Mod: ER | Performed by: EMERGENCY MEDICINE

## 2023-11-11 RX ORDER — POLYETHYLENE GLYCOL 3350 17 G/17G
17 POWDER, FOR SOLUTION ORAL DAILY
Qty: 10 EACH | Refills: 0 | Status: SHIPPED | OUTPATIENT
Start: 2023-11-11 | End: 2023-11-21

## 2023-11-11 RX ORDER — HYDROCORTISONE 1 %
CREAM (GRAM) TOPICAL 2 TIMES DAILY
Qty: 15 G | Refills: 0 | Status: SHIPPED | OUTPATIENT
Start: 2023-11-11 | End: 2023-11-16

## 2023-11-11 RX ORDER — SULFAMETHOXAZOLE AND TRIMETHOPRIM 800; 160 MG/1; MG/1
1 TABLET ORAL 2 TIMES DAILY
Qty: 14 TABLET | Refills: 0 | Status: SHIPPED | OUTPATIENT
Start: 2023-11-11 | End: 2023-11-18

## 2023-11-11 RX ORDER — ACETAMINOPHEN 500 MG
500 TABLET ORAL EVERY 4 HOURS PRN
Qty: 30 TABLET | Refills: 0 | Status: SHIPPED | OUTPATIENT
Start: 2023-11-11

## 2023-11-11 NOTE — Clinical Note
"Mary Kitchenhuong Guerra was seen and treated in our emergency department on 11/11/2023.  She may return to work on 11/13/2023.       If you have any questions or concerns, please don't hesitate to call.      SEBASTIÁN Tenorio RN    "

## 2023-11-11 NOTE — Clinical Note
"Mary Kitchenhuong Guerra was seen and treated in our emergency department on 11/11/2023.  She may return to work on 11/12/2023.       If you have any questions or concerns, please don't hesitate to call.      Dora Castillo PA-C"

## 2023-11-12 NOTE — DISCHARGE INSTRUCTIONS

## 2023-11-12 NOTE — ED PROVIDER NOTES
Encounter Date: 2023    SCRIBE #1 NOTE: I, Maricruz Boyd, am scribing for, and in the presence of,  Dora Castillo PA-C. I have scribed the following portions of the note - Other sections scribed: HPI, ROS.       History     Chief Complaint   Patient presents with    Abscess     Pt reports ruptured buttock abscess since yesterday that is causing worsening pain to vaginal and rectal area. Pt reports she has been trying to soak the area, but she is homeless and has not been able to do it as much as she would like.      Mary Guerra is a 37 y.o. female, with a past medical history of seizures, who presents to the ED with ruptured abscess to buttock that occurred yesterday. Patient reports worsening pain to rectum and vagina due to ruptured abscess. Patient states she has been trying to soak the area, but reports she is homeless and cannot bathe as much as she would like to. Patient also notes gray vaginal discharge that began this morning, constipation. Patient denies diarrhea, vaginal bleeding, shortness of breath, or chest pain. Patient denies concern for STIs because she has not been sexually active. Patient is on Depo injection. NKDA.    The history is provided by the patient. No  was used.     Review of patient's allergies indicates:  No Known Allergies  Past Medical History:   Diagnosis Date    Anxiety     PTSD (post-traumatic stress disorder)     Seizures      Past Surgical History:   Procedure Laterality Date     SECTION      x 2    MOUTH SURGERY       Family History   Problem Relation Age of Onset    No Known Problems Mother     No Known Problems Father     Heart disease Maternal Grandfather     Breast cancer Paternal Grandmother     Colon cancer Neg Hx     Ovarian cancer Neg Hx      Social History     Tobacco Use    Smoking status: Every Day     Types: Cigarettes, Cigars    Smokeless tobacco: Never    Tobacco comments:     Pt smokes 2 to 3 cigars a day.   Substance  Use Topics    Alcohol use: Yes     Comment: Pt reports she drinks liquor 2 to 3 times a week.    Drug use: Not Currently     Types: Marijuana     Review of Systems   Constitutional:  Negative for chills and fever.   HENT:  Negative for rhinorrhea and sore throat.    Respiratory:  Negative for cough and shortness of breath.    Cardiovascular:  Negative for chest pain.   Gastrointestinal:  Positive for constipation and rectal pain. Negative for abdominal pain, diarrhea, nausea and vomiting.   Genitourinary:  Positive for vaginal discharge (gray) and vaginal pain. Negative for dysuria and vaginal bleeding.   All other systems reviewed and are negative.      Physical Exam     Initial Vitals [11/11/23 1943]   BP Pulse Resp Temp SpO2   122/88 110 18 98.3 °F (36.8 °C) 97 %      MAP       --         Physical Exam    Nursing note and vitals reviewed.  Constitutional: She appears well-developed and well-nourished.  Non-toxic appearance. She does not appear ill.   HENT:   Head: Normocephalic and atraumatic.   Right Ear: Hearing, tympanic membrane, external ear and ear canal normal. Tympanic membrane is not perforated, not erythematous and not bulging.   Left Ear: Hearing, tympanic membrane, external ear and ear canal normal. Tympanic membrane is not perforated, not erythematous and not bulging.   Nose: Nose normal.   Mouth/Throat: Uvula is midline, oropharynx is clear and moist and mucous membranes are normal.   Eyes: Conjunctivae and EOM are normal.   Neck: Neck supple.   Normal range of motion.   Full passive range of motion without pain.     Cardiovascular:  Normal rate and regular rhythm.           Pulses:       Radial pulses are 2+ on the right side and 2+ on the left side.   Pulmonary/Chest: Effort normal and breath sounds normal. No accessory muscle usage. No respiratory distress. She has no decreased breath sounds.   Abdominal: Abdomen is soft. Bowel sounds are normal. She exhibits no distension. There is no abdominal  tenderness.   No right CVA tenderness.  No left CVA tenderness. There is no rebound and no guarding.   Genitourinary: Rectum:      External hemorrhoid present.   There is no rash, tenderness or lesion on the right labia. There is no rash, tenderness or lesion on the left labia.    Vaginal discharge present.      No vaginal erythema, tenderness or bleeding.   No erythema, tenderness or bleeding in the vagina.    No foreign body in the vagina.      No signs of injury in the vagina.      Genitourinary Comments:  exam chaperoned by nurse, Carolina.  Right perianal abscess noted that is actively draining white purulent drainage.  No surrounding areas of erythema or cellulitis.  Nonthrombosed External hemorrhoid to 6 o'clock position.  Yellow/white vaginal discharge.  No vaginal erythema, or cellulitis.  No foreign bodies.  No vaginal bleeding.     Musculoskeletal:         General: Normal range of motion.      Cervical back: Full passive range of motion without pain, normal range of motion and neck supple. No rigidity.     Neurological: She is alert. No cranial nerve deficit.   Neuro intact.  Strength and sensation intact bilateral upper and lower extremities.   Skin: Skin is warm and dry.   Psychiatric: She has a normal mood and affect.         ED Course   Procedures  Labs Reviewed   VAGINOSIS SCREEN BY DNA PROBE   C. TRACHOMATIS/N. GONORRHOEAE BY AMP DNA   POCT URINE PREGNANCY          Imaging Results    None          Medications - No data to display  Medical Decision Making  This is a 37 y.o. female, with a past medical history of seizures, who presents to the ED with ruptured abscess to buttock that occurred yesterday. Patient reports worsening pain to rectum and vagina due to ruptured abscess. Patient states she has been trying to soak the area, but reports she is homeless and cannot bathe as much as she would like to. Patient also notes gray vaginal discharge that began this morning, constipation.  On physical  exam, patient is well-appearing and in no acute distress.  Nontoxic appearing.  Lungs are clear to auscultation bilaterally.  Abdomen is soft and nontender.  No guarding, rigidity, rebound.  2+ radial pulses bilaterally.  Posterior oropharynx is not erythematous.  No edema or exudate.  Uvula midline.  Bilateral tympanic membrane is normal.  No erythema, bulging, or perforations.  Neuro intact.  Strength and sensation intact bilateral upper and lower extremities.   exam chaperoned by nurse, Carolina.  Right perianal abscess noted that is actively draining white purulent drainage.  No surrounding areas of erythema or cellulitis.  Nonthrombosed External hemorrhoid to 6 o'clock position.  Yellow/white vaginal discharge.  No vaginal erythema, or cellulitis.  No foreign bodies.  No vaginal bleeding.  Abscess was fully drained here.  Patient tolerated the procedure well with no acute complications.  Mild amounts of serosanguineous purulence and purulent drainage.  UPT negative.  Vaginosis screen and GC pending.  Will discharge patient on hydrocortisone cream, Tylenol, Bactrim, and MiraLax.  Advised patient to drink lot of fluids upon discharge.  Urged prompt follow-up with PCP for further evaluation.    Strict return precautions given. I discussed with the patient/family the diagnosis, treatment plan, indications for return to the emergency department, and for expected follow-up. The patient/family verbalized an understanding. The patient/family is asked if there are any questions or concerns. We discuss the case, until all issues are addressed to the patient/family's satisfaction. Patient/family understands and is agreeable to the plan. Patient is stable and ready for discharge.      Amount and/or Complexity of Data Reviewed  Labs: ordered.    Risk  OTC drugs.  Prescription drug management.            Scribe Attestation:   Scribe #1: I performed the above scribed service and the documentation accurately describes the  services I performed. I attest to the accuracy of the note.                      I, Dora Castillo, personally performed the services described in this documentation.  All medical record entries made by the scribe were at my direction and in my presence.  I have reviewed the chart and agree that the record reflects my personal performance and is accurate and complete.   Clinical Impression:   Final diagnoses:  [N89.8] Vaginal discharge  [K64.4] External hemorrhoid  [K61.0] Perianal abscess (Primary)        ED Disposition Condition    Discharge Stable          ED Prescriptions       Medication Sig Dispense Start Date End Date Auth. Provider    hydrocortisone 1 % cream Apply topically 2 (two) times daily. for 5 days 15 g 11/11/2023 11/16/2023 Dora Castillo PA-C    acetaminophen (TYLENOL) 500 MG tablet Take 1 tablet (500 mg total) by mouth every 4 (four) hours as needed for Pain or Temperature greater than (100.5 or greater). 30 tablet 11/11/2023 -- Dora Castillo PA-C    sulfamethoxazole-trimethoprim 800-160mg (BACTRIM DS) 800-160 mg Tab Take 1 tablet by mouth 2 (two) times daily. for 7 days 14 tablet 11/11/2023 11/18/2023 Dora Castillo PA-C    polyethylene glycol (GLYCOLAX) 17 gram PwPk Take 17 g by mouth once daily. for 10 days 10 each 11/11/2023 11/21/2023 Dora Castillo PA-C          Follow-up Information       Follow up With Specialties Details Why Contact [facility]    Clinic, [facility] Urgent Care In 2 days for further evaluation [address] [place] [address]  [phone]      [facility] ED Emergency Medicine In 2 days If symptoms worsen [address]  [facility] [address] [phone]             Dora Castillo PA-C  11/11/23 1643

## 2023-11-14 LAB
BACTERIAL VAGINOSIS DNA: POSITIVE
CANDIDA GLABRATA DNA: NEGATIVE
CANDIDA KRUSEI DNA: NEGATIVE
CANDIDA RRNA VAG QL PROBE: NEGATIVE
T VAGINALIS RRNA GENITAL QL PROBE: POSITIVE

## 2023-11-14 RX ORDER — METRONIDAZOLE 500 MG/1
500 TABLET ORAL EVERY 12 HOURS
Qty: 14 TABLET | Refills: 0 | Status: SHIPPED | OUTPATIENT
Start: 2023-11-14 | End: 2023-11-21

## 2023-11-16 LAB
C TRACH DNA SPEC QL NAA+PROBE: NOT DETECTED
N GONORRHOEA DNA SPEC QL NAA+PROBE: NOT DETECTED
